# Patient Record
Sex: MALE | Race: OTHER | Employment: FULL TIME | URBAN - METROPOLITAN AREA
[De-identification: names, ages, dates, MRNs, and addresses within clinical notes are randomized per-mention and may not be internally consistent; named-entity substitution may affect disease eponyms.]

---

## 2021-04-18 ENCOUNTER — HOSPITAL ENCOUNTER (EMERGENCY)
Age: 46
Discharge: HOME | End: 2021-04-19
Attending: EMERGENCY MEDICINE
Payer: COMMERCIAL

## 2021-04-18 VITALS
OXYGEN SATURATION: 96 % | DIASTOLIC BLOOD PRESSURE: 86 MMHG | SYSTOLIC BLOOD PRESSURE: 161 MMHG | HEART RATE: 77 BPM | TEMPERATURE: 97.9 F | WEIGHT: 215 LBS | RESPIRATION RATE: 16 BRPM | HEIGHT: 69 IN | BODY MASS INDEX: 31.84 KG/M2

## 2021-04-18 DIAGNOSIS — K02.9 PAIN DUE TO DENTAL CARIES: Primary | ICD-10-CM

## 2021-04-18 PROCEDURE — 64400 NJX AA&/STRD TRIGEMINAL NRV: CPT

## 2021-04-18 PROCEDURE — 99283 EMERGENCY DEPT VISIT LOW MDM: CPT

## 2021-04-18 RX ORDER — CLINDAMYCIN HYDROCHLORIDE 300 MG/1
300 CAPSULE ORAL 4 TIMES DAILY
COMMUNITY
Start: 2021-04-15

## 2021-04-18 RX ORDER — ZOLPIDEM TARTRATE 10 MG/1
1 TABLET ORAL NIGHTLY PRN
COMMUNITY
Start: 2021-04-06

## 2021-04-18 RX ORDER — AZITHROMYCIN 500 MG/1
500 TABLET, FILM COATED ORAL DAILY
COMMUNITY

## 2021-04-18 ASSESSMENT — PAIN DESCRIPTION - DESCRIPTORS: DESCRIPTORS: SHARP;SHOOTING;RADIATING

## 2021-04-18 ASSESSMENT — PAIN DESCRIPTION - PROGRESSION: CLINICAL_PROGRESSION: GRADUALLY WORSENING

## 2021-04-18 ASSESSMENT — PAIN SCALES - GENERAL: PAINLEVEL_OUTOF10: 10

## 2021-04-18 ASSESSMENT — PAIN DESCRIPTION - ORIENTATION: ORIENTATION: LEFT;LOWER;UPPER

## 2021-04-18 ASSESSMENT — PAIN DESCRIPTION - ONSET: ONSET: SUDDEN

## 2021-04-18 ASSESSMENT — PAIN DESCRIPTION - LOCATION: LOCATION: TEETH

## 2021-04-19 NOTE — ED PROVIDER NOTES
Emergency Physician Note    Chief Complaint  Dental Pain (Patient was seen at Urgent care today for upper and lower bad teeth on left side. Patient was given an antibotic shot and steroid shot. )       History of Present Illness  Seb Pittman is a 54 y.o. male who presents to the ED for tooth pain. Patient reports that a couple weeks ago he had pain in his teeth and was given a course of clindamycin which resolved his symptoms. He states that recently the pain returned and he was restarted on clindamycin. He denies any fevers or any troubles breathing or swallowing. He states he went to urgent care today because he was continued to have pain in his teeth. There is a left upper premolar and the left lower molar that are bothering him. He states he was given some steroids and an antibiotic shot but is continuing to have pain. He is taking Tylenol and ibuprofen over-the-counter as prescribed. He denies any desire for opioids because he has a history of a prior opioid use disorder. 10 systems reviewed, pertinent positives per HPI otherwise noted to be negative    I have reviewed the following from the nursing documentation:      Prior to Admission medications    Medication Sig Start Date End Date Taking? Authorizing Provider   clindamycin (CLEOCIN) 300 MG capsule Take 300 mg by mouth 4 times daily 4/15/21  Yes Historical Provider, MD   azithromycin (ZITHROMAX) 500 MG tablet Take 500 mg by mouth daily   Yes Historical Provider, MD   zolpidem (AMBIEN) 10 MG tablet Take 1 tablet by mouth nightly as needed. 4/6/21   Historical Provider, MD   ibuprofen (IBU) 800 MG tablet Take 1 tablet by mouth every 8 hours as needed for Pain for 30 doses. 9/4/13   GO Napoles   omeprazole (PRILOSEC) 20 MG capsule Take 20 mg by mouth daily.       Historical Provider, MD       Allergies as of 04/18/2021 - Review Complete 04/18/2021   Allergen Reaction Noted    Amoxicillin Hives 09/06/2018    Albuterol  08/02/2011    Bupropion Swelling 06/29/2011    Tylenol [acetaminophen]  09/04/2013       Past Medical History:   Diagnosis Date    Hypercholesteremia         Surgical History:   Past Surgical History:   Procedure Laterality Date    ADENOIDECTOMY      APPENDECTOMY      KIDNEY SURGERY      SHOULDER SURGERY      TESTICLE SURGERY      TONSILLECTOMY          Family History:  No family history on file. Social History     Socioeconomic History    Marital status:      Spouse name: Not on file    Number of children: Not on file    Years of education: Not on file    Highest education level: Not on file   Occupational History    Not on file   Social Needs    Financial resource strain: Not on file    Food insecurity     Worry: Not on file     Inability: Not on file    Transportation needs     Medical: Not on file     Non-medical: Not on file   Tobacco Use    Smoking status: Current Every Day Smoker     Packs/day: 0.50    Smokeless tobacco: Never Used   Substance and Sexual Activity    Alcohol use: No    Drug use: Yes     Frequency: 7.0 times per week     Types: Marijuana    Sexual activity: Yes     Partners: Female   Lifestyle    Physical activity     Days per week: Not on file     Minutes per session: Not on file    Stress: Not on file   Relationships    Social connections     Talks on phone: Not on file     Gets together: Not on file     Attends Alevism service: Not on file     Active member of club or organization: Not on file     Attends meetings of clubs or organizations: Not on file     Relationship status: Not on file    Intimate partner violence     Fear of current or ex partner: Not on file     Emotionally abused: Not on file     Physically abused: Not on file     Forced sexual activity: Not on file   Other Topics Concern    Not on file   Social History Narrative    Not on file       Nursing notes reviewed.     ED Triage Vitals [04/18/21 2311]   Enc Vitals Group      BP (!) 161/86      Pulse 77 Resp 16      Temp 97.9 °F (36.6 °C)      Temp Source Oral      SpO2 96 %      Weight 215 lb (97.5 kg)      Height 5' 9\" (1.753 m)      Head Circumference       Peak Flow       Pain Score       Pain Loc       Pain Edu? Excl. in [address]? GENERAL:  Awake, alert. Well developed, well nourished with moderate distress from pain. HENT:  Normocephalic, Atraumatic, moist mucous membranes. Several carious teeth. Teeth numbers 12, 13 and 17 are tender to palpation. No dental abscess seen. Floor mouth is soft. Normal mandibular excursion. EYES:  Pupils equal round and reactive to light, Conjunctiva normal, extraocular movements normal.  NECK:  No meningeal signs, Supple. EXTREMITIES:  Normal range of motion, no edema, no bony tenderness, no deformity, distal pulses present. SKIN: Warm, dry and intact. NEUROLOGIC: Normal mental status. Moving all extremities to command. PROCEDURES  Dental blocks  I performed a supraperiosteal block of the teeth numbers 12 and 13 with a total of 4 cc of 0.5% bupivacaine. I then performed a left inferior alveolar nerve block using 4 cc of 0.5% bupivacaine. Patient tolerated procedure well with minimal pain and no bleeding. MEDICAL DECISION MAKING        I advised the patient to return to the emergency department immediately for any new or worsening symptoms, such as fever, trouble breathing or trouble swallowing. The patient voiced agreement and understanding of the treatment plan. No results found for this visit on 04/18/21. I estimate there is LOW risk for a ANAPHYLAXIS, DEEP SPACE INFECTION (e.g., BEATRICES ANGINA OR RETROPHARYNGEAL ABSCESS), EPIGLOTTITIS, MENINGITIS, or AIRWAY COMPROMISE, thus I consider the discharge disposition reasonable. Also, there is no evidence or peritonitis, sepsis, or toxicity. Claudene Luck and I have discussed the diagnosis and risks, and we agree with discharging home to follow-up with their primary doctor.  We also